# Patient Record
Sex: FEMALE
[De-identification: names, ages, dates, MRNs, and addresses within clinical notes are randomized per-mention and may not be internally consistent; named-entity substitution may affect disease eponyms.]

---

## 2021-07-07 ENCOUNTER — APPOINTMENT (OUTPATIENT)
Dept: ORTHOPEDIC SURGERY | Facility: CLINIC | Age: 46
End: 2021-07-07

## 2021-07-24 PROBLEM — Z00.00 ENCOUNTER FOR PREVENTIVE HEALTH EXAMINATION: Status: ACTIVE | Noted: 2021-07-24

## 2023-06-01 ENCOUNTER — APPOINTMENT (OUTPATIENT)
Dept: OTOLARYNGOLOGY | Facility: CLINIC | Age: 48
End: 2023-06-01

## 2024-09-26 ENCOUNTER — APPOINTMENT (OUTPATIENT)
Dept: ORTHOPEDIC SURGERY | Facility: CLINIC | Age: 49
End: 2024-09-26
Payer: COMMERCIAL

## 2024-09-26 DIAGNOSIS — Z86.39 PERSONAL HISTORY OF OTHER ENDOCRINE, NUTRITIONAL AND METABOLIC DISEASE: ICD-10-CM

## 2024-09-26 DIAGNOSIS — S63.639A SPRAIN OF INTERPHALANGEAL JOINT OF UNSPECIFIED FINGER, INITIAL ENCOUNTER: ICD-10-CM

## 2024-09-26 DIAGNOSIS — E11.9 TYPE 2 DIABETES MELLITUS W/OUT COMPLICATIONS: ICD-10-CM

## 2024-09-26 DIAGNOSIS — S63.619A UNSPECIFIED SPRAIN OF UNSPECIFIED FINGER, INITIAL ENCOUNTER: ICD-10-CM

## 2024-09-26 PROCEDURE — 99203 OFFICE O/P NEW LOW 30 MIN: CPT

## 2024-09-26 PROCEDURE — 73130 X-RAY EXAM OF HAND: CPT | Mod: RT

## 2024-09-26 RX ORDER — MELOXICAM 15 MG/1
15 TABLET ORAL
Qty: 30 | Refills: 0 | Status: ACTIVE | COMMUNITY
Start: 2024-09-26 | End: 1900-01-01

## 2024-09-30 NOTE — DISCUSSION/SUMMARY
[de-identified] : - reviewed the nature of this injury and prognosis with the patient in layman's terms - discussed indications for both operative and nonoperative treatment - reviewed conservative treatment options including the role for early active range of motion, anti-inflammatory medications and therapy - reviewed risks, benefits and alternatives to these - activity modifications, range of motion exercises demonstrated - buddy loops - NSAIDs as needed for pain, Mobic Rx provided - f/u prn  My cumulative time spent on this patient's visit included: Preparation for the visit, review of the medical records, review of pertinent diagnostic studies, examination and counseling of the patient on the above diagnosis, treatment plan and prognosis, orders of diagnostic tests, medications and/or appropriate procedures and documentation in the medical records of today's visit.

## 2024-09-30 NOTE — IMAGING
[de-identified] : Right middle finger Mildly swollen at the PIP joint, TTP Normal muscle tone/ bulk Full symmetric wrist ROM Able to make a full composite fist with mild pain +AIN/ PIN/ Ulnar n SILT throughout fingers wwp - Randy's test  3 views right hand: No acute fractures or malalignment

## 2024-09-30 NOTE — HISTORY OF PRESENT ILLNESS
[de-identified] : 09/26/2024 GINI HENNESSY is a 48 year-old female here today for: Location: right hand  Complaint: The patient presents to the office today for evaluation of right dorsal hand pain.  She notes a sudden onset of pain beginning roughly a week ago after mechanical fall, striking her hand on the adjacent wall.  She believes she hyperextended her middle finger.  Since that time she notes mild pain in the PIP joint which is improving gradually.  No previous injury, numbness or tingling. Symptom onset: 9/17/2024 Prior treatments: none Hand Dominance: right  Occupation: nurse - OBGYN post-partum PMH: diabetes (A1c 8.0), hypothyroidism Allergies: NKDA

## 2024-09-30 NOTE — IMAGING
[de-identified] : Right middle finger Mildly swollen at the PIP joint, TTP Normal muscle tone/ bulk Full symmetric wrist ROM Able to make a full composite fist with mild pain +AIN/ PIN/ Ulnar n SILT throughout fingers wwp - Randy's test  3 views right hand: No acute fractures or malalignment

## 2024-09-30 NOTE — HISTORY OF PRESENT ILLNESS
[de-identified] : 09/26/2024 GINI HENNESSY is a 48 year-old female here today for: Location: right hand  Complaint: The patient presents to the office today for evaluation of right dorsal hand pain.  She notes a sudden onset of pain beginning roughly a week ago after mechanical fall, striking her hand on the adjacent wall.  She believes she hyperextended her middle finger.  Since that time she notes mild pain in the PIP joint which is improving gradually.  No previous injury, numbness or tingling. Symptom onset: 9/17/2024 Prior treatments: none Hand Dominance: right  Occupation: nurse - OBGYN post-partum PMH: diabetes (A1c 8.0), hypothyroidism Allergies: NKDA

## 2024-09-30 NOTE — DISCUSSION/SUMMARY
[de-identified] : - reviewed the nature of this injury and prognosis with the patient in layman's terms - discussed indications for both operative and nonoperative treatment - reviewed conservative treatment options including the role for early active range of motion, anti-inflammatory medications and therapy - reviewed risks, benefits and alternatives to these - activity modifications, range of motion exercises demonstrated - buddy loops - NSAIDs as needed for pain, Mobic Rx provided - f/u prn  My cumulative time spent on this patient's visit included: Preparation for the visit, review of the medical records, review of pertinent diagnostic studies, examination and counseling of the patient on the above diagnosis, treatment plan and prognosis, orders of diagnostic tests, medications and/or appropriate procedures and documentation in the medical records of today's visit.

## 2024-09-30 NOTE — HISTORY OF PRESENT ILLNESS
[de-identified] : 09/26/2024 GINI HENNESSY is a 48 year-old female here today for: Location: right hand  Complaint: The patient presents to the office today for evaluation of right dorsal hand pain.  She notes a sudden onset of pain beginning roughly a week ago after mechanical fall, striking her hand on the adjacent wall.  She believes she hyperextended her middle finger.  Since that time she notes mild pain in the PIP joint which is improving gradually.  No previous injury, numbness or tingling. Symptom onset: 9/17/2024 Prior treatments: none Hand Dominance: right  Occupation: nurse - OBGYN post-partum PMH: diabetes (A1c 8.0), hypothyroidism Allergies: NKDA

## 2024-09-30 NOTE — DISCUSSION/SUMMARY
[de-identified] : - reviewed the nature of this injury and prognosis with the patient in layman's terms - discussed indications for both operative and nonoperative treatment - reviewed conservative treatment options including the role for early active range of motion, anti-inflammatory medications and therapy - reviewed risks, benefits and alternatives to these - activity modifications, range of motion exercises demonstrated - buddy loops - NSAIDs as needed for pain, Mobic Rx provided - f/u prn  My cumulative time spent on this patient's visit included: Preparation for the visit, review of the medical records, review of pertinent diagnostic studies, examination and counseling of the patient on the above diagnosis, treatment plan and prognosis, orders of diagnostic tests, medications and/or appropriate procedures and documentation in the medical records of today's visit.

## 2024-09-30 NOTE — IMAGING
[de-identified] : Right middle finger Mildly swollen at the PIP joint, TTP Normal muscle tone/ bulk Full symmetric wrist ROM Able to make a full composite fist with mild pain +AIN/ PIN/ Ulnar n SILT throughout fingers wwp - Randy's test  3 views right hand: No acute fractures or malalignment

## 2024-10-10 ENCOUNTER — APPOINTMENT (OUTPATIENT)
Dept: ORTHOPEDIC SURGERY | Facility: CLINIC | Age: 49
End: 2024-10-10